# Patient Record
Sex: FEMALE | Race: WHITE | URBAN - METROPOLITAN AREA
[De-identification: names, ages, dates, MRNs, and addresses within clinical notes are randomized per-mention and may not be internally consistent; named-entity substitution may affect disease eponyms.]

---

## 2021-11-15 ENCOUNTER — APPOINTMENT (RX ONLY)
Dept: URBAN - METROPOLITAN AREA CLINIC 23 | Facility: CLINIC | Age: 41
Setting detail: DERMATOLOGY
End: 2021-11-15

## 2021-11-15 DIAGNOSIS — Z41.9 ENCOUNTER FOR PROCEDURE FOR PURPOSES OTHER THAN REMEDYING HEALTH STATE, UNSPECIFIED: ICD-10-CM

## 2021-11-15 PROCEDURE — ? RECOMMENDATIONS

## 2021-11-15 PROCEDURE — ? PULSED-DYE LASER

## 2021-11-15 NOTE — PROCEDURE: RECOMMENDATIONS
Recommendations (Free Text): Hydroquinone, brighting pads, cyspera
Recommendation Preamble: The following recommendations were made during the visit:
Detail Level: Detailed
Render Risk Assessment In Note?: no

## 2022-01-04 ENCOUNTER — APPOINTMENT (RX ONLY)
Dept: URBAN - METROPOLITAN AREA CLINIC 23 | Facility: CLINIC | Age: 42
Setting detail: DERMATOLOGY
End: 2022-01-04

## 2022-01-04 DIAGNOSIS — Z41.9 ENCOUNTER FOR PROCEDURE FOR PURPOSES OTHER THAN REMEDYING HEALTH STATE, UNSPECIFIED: ICD-10-CM

## 2022-01-04 PROCEDURE — ? PULSED-DYE LASER

## 2022-01-04 PROCEDURE — ? CANDELA NORDLYS

## 2022-01-04 PROCEDURE — ? IN-HOUSE DISPENSING PHARMACY

## 2022-01-04 NOTE — PROCEDURE: PULSED-DYE LASER
Location Override: face
Pulse Duration: 1.5 ms
Pulse Duration: 10 ms
Cryogen Time (Ms): 23755 Nathan Huber
Cryogen Time (Ms): 10
Treated Area: medium area
Post-Procedure Care: Sunscreen applied. Post care reviewed with patient.
Consent: Written consent obtained, risks reviewed including but not limited to crusting, scabbing, blistering, scarring, darker or lighter pigmentary change, incidental hair removal, bruising, and/or incomplete removal.
Delay Time (Ms): 9[facility]
Spot Size: 7 mm
Spot Size: 10x3 mm
Post-Care Instructions: I reviewed with the patient in detail post-care instructions. Patient should stay away from the sun and wear sun protection until treated areas are fully healed.
Price (Use Numbers Only, No Special Characters Or $): 0.00
Detail Level: Zone
Fluence In J/Cm2 (Optional): 6.50
Fluence In J/Cm2 (Optional): 10.0
Spot Size: 10 mm
Cryogen Time (Ms): 30
Laser Type: Vbeam 595nm
Treated Area: small area
Delay Time (Ms): 20
Fluence In J/Cm2 (Optional): 10.50

## 2022-01-04 NOTE — PROCEDURE: IN-HOUSE DISPENSING PHARMACY
Product 25 Amount/Unit (Numbers Only): 0
Product 71 Unit Type: mg
Product 5 Amount/Unit (Numbers Only): 1
Product 1 Price/Unit (In Dollars): 0.00
Product 3 Application Directions: Apply a pea size amount to the entire face at night
Name Of Product 8: TNS Essential
Product 14 Application Directions: Apply to the under eyes in the Am and pm
Product 3 Unit Type: grams
Name Of Product 19: Diazepam 5mg
Product 14 Unit Type: tube(s)
Product 8 Application Directions: Apply to the clean face in the AM and PM daily
Name Of Product 4: Valium 5 mg
Product 8 Unit Type: bottle(s)
Product 6 Unit Type: tablets
Name Of Product 13: Skin Better Even tone correcting serum
Product 11 Application Directions: Apply a thin layer to the acne prone areas in the AM
Product 2 Application Directions: apply to affected areas left lower leg am and pm  as indicated
Product 22 Application Directions: Apply to the affected area of the face
Name Of Product 7: Latisse 5ml
Product 13 Application Directions: Apply to the entire face in the Am and pm
Name Of Product 12: Retinol Lite
Product 7 Application Directions: Apply to lashes at bedtime daily as indicated
Name Of Product 3: Tretinoin 0.05% cream
Product 7 Unit Type: ml
Name Of Product 14: Skin Better Interfuse eye treatment cream
Name Of Product 21: Valium 5mg
Product 3 Amount/Unit (Numbers Only): [address] [facility]
Product 10 Application Directions: Take 1 tablet 2 times daily for 3 days
Product 1 Application Directions: Apply thin layer to affected areas face at bedtime only as indicated.
Name Of Product 6: Prednisone 20mg
Product 21 Application Directions: Take one tablet 30 minutes prior to procedure
Product 12 Application Directions: Apply a pea size amount to the entire face at bedtime.
Name Of Product 2: Valtrex
Name Of Product 11: Clearing gel
Product 6 Application Directions: Take one tablet for swelling today at the office as indicated and one tablet tomorrow
Name Of Product 22: Brightening Pads
Product 4 Application Directions: Take one tablet half hour today prior to procedure as indicated.  Dispense in office
Detail Level: Zone
Name Of Product 9: Clearing tone pads
Send Charges To Patient Encounter: No
Product 2 Amount/Unit (Numbers Only): 6
Product 9 Application Directions: Apply to the acne prone areas in the Am and Pm
Name Of Product 5: loratadine 10 mg
Product 9 Unit Type: jar(s)
Product 7 Amount/Unit (Numbers Only): 5
Name Of Product 1: Hydroquinone 4% / Tretinoin 0.05% / Fluocinolone 0.01%
Name Of Product 10: Valtrex 500 mg
Product 5 Application Directions: Take one tablet today after procedure with full glass of water as indicated

## 2022-01-04 NOTE — PROCEDURE: CANDELA NORDLYS
Hsv Prophylaxis Prescription: Valtrex 500mg BID starting the day of procedures and continuing until all sites healed
Location: axillae
Pulse Duration In Ms (Will Not Render If Zero): 0
Modality: SWT
Length Topical Anesthesia Applied (Optional): 15 minutes
Fluence In J/Cm2: 100
Location: upper cutaneous lip
Treatment Number (Will Not Render If Zero): 1
Fluence In J/Cm2(Optional): 11.6  j/cm2
Applicator:  (600-950nm)
Pulse Time (Will Not Render If Zero): [address]
Consent: Written consent obtained, risks reviewed including but not limited to crusting, scabbing, blistering, scarring, darker or lighter pigmentary change, and/or incomplete removal.
Post-Care Instructions: I reviewed with the patient in detail post-care instructions.
Fluence In J/Cm2(Optional): 13.2 j/cm2
Fluence In J/Cm2(Optional): 12 j/cm2
Pulse Time (Will Not Render If Zero): [address]
Eye Shielding Text (Leave Blank If Unwanted- Will Be Inserted If Selecting Eye Shields): The intraocular eye shields were placed. 2 drops of intraocular tetracaine HCL ophthalmic 0.5% solution was administered. The eye shields were coated with ophthalmic bacitracin prior to insertion. After the shields were removed the eyes were flushed with normal saline.
Pulse Time In Ms (Will Not Render If Zero): 20
Detail Level: Zone
Location: chin
Hsv Prophylaxis: no

## 2022-04-19 NOTE — PROCEDURE: PULSED-DYE LASER
Laser Type: Vbeam 595nm
Cryogen Time (Ms): 98900 Nathan Huber
Treated Area: small area
Spot Size Override: 3x10
Spot Size: 10 mm
Cryogen Time (Ms): 30
Pulse Duration: 10 ms
Delay Time (Ms): 4564 [facility]
Fluence In J/Cm2 (Optional): 10.00
Spot Size: 10x3 mm
Delay Time (Ms): 20
Cryogen Time (Ms): 10
Treated Area: medium area
Consent: Written consent obtained, risks reviewed including but not limited to crusting, scabbing, blistering, scarring, darker or lighter pigmentary change, incidental hair removal, bruising, and/or incomplete removal.
Post-Procedure Care: Sunscreen applied. Post care reviewed with patient.
Location Override: face
Location Override: face post cosmetic
Fluence In J/Cm2 (Optional): 10.0
Fluence In J/Cm2 (Optional): 6. 75
Price (Use Numbers Only, No Special Characters Or $): 0.00
Detail Level: Zone
Post-Care Instructions: I reviewed with the patient in detail post-care instructions. Patient should stay away from the sun and wear sun protection until treated areas are fully healed.
Spot Size: 7 mm
patient

## 2023-11-27 ENCOUNTER — APPOINTMENT (RX ONLY)
Dept: URBAN - METROPOLITAN AREA CLINIC 23 | Facility: CLINIC | Age: 43
Setting detail: DERMATOLOGY
End: 2023-11-27

## 2023-11-27 ENCOUNTER — RX ONLY (OUTPATIENT)
Age: 43
Setting detail: RX ONLY
End: 2023-11-27

## 2023-11-27 DIAGNOSIS — D49.2 NEOPLASM OF UNSPECIFIED BEHAVIOR OF BONE, SOFT TISSUE, AND SKIN: ICD-10-CM

## 2023-11-27 DIAGNOSIS — L81.4 OTHER MELANIN HYPERPIGMENTATION: ICD-10-CM

## 2023-11-27 DIAGNOSIS — D22 MELANOCYTIC NEVI: ICD-10-CM

## 2023-11-27 DIAGNOSIS — L82.0 INFLAMED SEBORRHEIC KERATOSIS: ICD-10-CM

## 2023-11-27 DIAGNOSIS — D18.0 HEMANGIOMA: ICD-10-CM

## 2023-11-27 PROBLEM — D23.72 OTHER BENIGN NEOPLASM OF SKIN OF LEFT LOWER LIMB, INCLUDING HIP: Status: ACTIVE | Noted: 2023-11-27

## 2023-11-27 PROBLEM — D18.01 HEMANGIOMA OF SKIN AND SUBCUTANEOUS TISSUE: Status: ACTIVE | Noted: 2023-11-27

## 2023-11-27 PROBLEM — D22.5 MELANOCYTIC NEVI OF TRUNK: Status: ACTIVE | Noted: 2023-11-27

## 2023-11-27 PROCEDURE — 11102 TANGNTL BX SKIN SINGLE LES: CPT | Mod: 59

## 2023-11-27 PROCEDURE — ? BENIGN DESTRUCTION

## 2023-11-27 PROCEDURE — 17110 DESTRUCTION B9 LES UP TO 14: CPT

## 2023-11-27 PROCEDURE — ? BIOPSY BY SHAVE METHOD

## 2023-11-27 PROCEDURE — 99213 OFFICE O/P EST LOW 20 MIN: CPT | Mod: 25

## 2023-11-27 PROCEDURE — 11103 TANGNTL BX SKIN EA SEP/ADDL: CPT | Mod: 59

## 2023-11-27 PROCEDURE — ? COUNSELING

## 2023-11-27 RX ORDER — FLUOCINOLONE ACETONIDE, HYDROQUINONE, AND TRETINOIN .1; 40; .5 MG/G; MG/G; MG/G
CREAM TOPICAL
Qty: 30 | Refills: 0 | Status: ERX | COMMUNITY
Start: 2023-11-27

## 2023-11-27 ASSESSMENT — LOCATION DETAILED DESCRIPTION DERM
LOCATION DETAILED: RIGHT INFERIOR LATERAL MIDBACK
LOCATION DETAILED: RIGHT INFERIOR LATERAL NECK
LOCATION DETAILED: LEFT CENTRAL MALAR CHEEK
LOCATION DETAILED: RIGHT LATERAL ABDOMEN
LOCATION DETAILED: RIGHT SUPERIOR LATERAL MIDBACK
LOCATION DETAILED: RIGHT INFERIOR MEDIAL MIDBACK

## 2023-11-27 ASSESSMENT — LOCATION SIMPLE DESCRIPTION DERM
LOCATION SIMPLE: RIGHT LOWER BACK
LOCATION SIMPLE: RIGHT ANTERIOR NECK
LOCATION SIMPLE: LEFT CHEEK
LOCATION SIMPLE: ABDOMEN

## 2023-11-27 ASSESSMENT — LOCATION ZONE DERM
LOCATION ZONE: TRUNK
LOCATION ZONE: FACE
LOCATION ZONE: NECK

## 2023-11-27 NOTE — PROCEDURE: BENIGN DESTRUCTION
Render Post-Care Instructions In Note?: yes
Post-Care Instructions: I reviewed with the patient in detail post-care instructions. Patient is to wear sunprotection, and avoid picking at any of the treated lesions. Pt may apply Vaseline to crusted or scabbing areas.
Treatment Number (Will Not Render If 0): 1
Include Z78.9 (Other Specified Conditions Influencing Health Status) As An Associated Diagnosis?: No
Medical Necessity Clause: This procedure was medically necessary because the lesions that were treated were:
Consent: The patient's consent was obtained including but not limited to risks of crusting, scabbing, blistering, scarring, darker or lighter pigmentary change, recurrence, incomplete removal and infection.
Anesthesia Volume In Cc: 0.3
Anesthesia Type: 1% Xylocaine with 1:200,000 epinephrine and sodium bicarbonate
Detail Level: Simple
Medical Necessity Information: It is in your best interest to select a reason for this procedure from the list below. All of these items fulfill various CMS LCD requirements except the new and changing color options.

## 2023-11-27 NOTE — PROCEDURE: BIOPSY BY SHAVE METHOD
Detail Level: Detailed
Depth Of Biopsy: dermis
Was A Bandage Applied: Yes
Size Of Lesion In Cm: 0
Biopsy Type: H and E
Biopsy Method: 15 blade
Anesthesia Type: 1% lidocaine without epinephrine
Anesthesia Volume In Cc (Will Not Render If 0): 0.3
Hemostasis: Electrocautery
Wound Care: Vaseline
Dressing: bandage
Destruction After The Procedure: No
Type Of Destruction Used: Curettage
Curettage Text: The wound bed was treated with curettage after the biopsy was performed.
Cryotherapy Text: The wound bed was treated with cryotherapy after the biopsy was performed.
Electrodesiccation Text: The wound bed was treated with electrodesiccation after the biopsy was performed.
Electrodesiccation And Curettage Text: The wound bed was treated with electrodesiccation and curettage after the biopsy was performed.
Silver Nitrate Text: The wound bed was treated with silver nitrate after the biopsy was performed.
Lab: [id number]
Lab Facility: [id number]
Consent: The provider's intent is to obtain a tissue sample solely for diagnostic purposes. Written consent to obtain tissue sample was obtained and risks were reviewed including but not limited to scarring, infection, bleeding, scabbing, incomplete removal, nerve damage and allergy to anesthesia.
Post-Care Instructions: I reviewed with the patient in detail post-care instructions. Patient is to keep the biopsy site dry overnight, and then apply bacitracin twice daily until healed. Patient may apply hydrogen peroxide soaks to remove any crusting.
Notification Instructions: Patient will be notified of biopsy results. However, patient instructed to call the office if not contacted within 2 weeks.
Billing Type: [employer]
Information: Selecting Yes will display possible errors in your note based on the variables you have selected. This validation is only offered as a suggestion for you. PLEASE NOTE THAT THE VALIDATION TEXT WILL BE REMOVED WHEN YOU FINALIZE YOUR NOTE. IF YOU WANT TO FAX A PRELIMINARY NOTE YOU WILL NEED TO TOGGLE THIS TO 'NO' IF YOU DO NOT WANT IT IN YOUR FAXED NOTE.
Lab: -[id number]
Lab Facility: [id number]
Consent: The provider's intent is to obtain a tissue sample solely for diagnostic purposes.  Written consent to obtain tissue sample was obtained and risks were reviewed including but not limited to scarring, infection, bleeding, scabbing, incomplete removal, nerve damage and allergy to anesthesia.
Billing Type: Third-Party Bill

## 2023-12-01 ENCOUNTER — RX ONLY (OUTPATIENT)
Age: 43
Setting detail: RX ONLY
End: 2023-12-01

## 2023-12-01 RX ORDER — TRIAMCINOLONE ACETONIDE 1 MG/G
CREAM TOPICAL
Qty: 15 | Refills: 0 | Status: ERX | COMMUNITY
Start: 2023-12-01

## 2024-10-24 ENCOUNTER — APPOINTMENT (RX ONLY)
Dept: URBAN - METROPOLITAN AREA CLINIC 23 | Facility: CLINIC | Age: 44
Setting detail: DERMATOLOGY
End: 2024-10-24

## 2024-10-24 DIAGNOSIS — S31000A OPEN WOUND(S) (MULTIPLE) OF UNSPECIFIED SITE(S), WITHOUT MENTION OF COMPLICATION: ICD-10-CM

## 2024-10-24 PROBLEM — T07.XXXA UNSPECIFIED MULTIPLE INJURIES, INITIAL ENCOUNTER: Status: ACTIVE | Noted: 2024-10-24

## 2024-10-24 PROCEDURE — 99212 OFFICE O/P EST SF 10 MIN: CPT

## 2024-10-24 PROCEDURE — ? PRESCRIPTION

## 2024-10-24 PROCEDURE — ? PRESCRIPTION MEDICATION MANAGEMENT

## 2024-10-24 RX ORDER — MUPIROCIN 20 MG/G
OINTMENT TOPICAL
Qty: 22 | Refills: 0 | Status: ERX | COMMUNITY
Start: 2024-10-24

## 2024-10-24 RX ADMIN — MUPIROCIN: 20 OINTMENT TOPICAL at 00:00

## 2024-10-24 NOTE — PROCEDURE: PRESCRIPTION MEDICATION MANAGEMENT
Plan: If facial wounds not improving, consider course of antibiotics
Initiate Treatment: mupirocin ointment daily to facial wounds for 1 week \\nAquaphor to arms and leg wounds
Detail Level: Zone
Render In Strict Bullet Format?: No